# Patient Record
(demographics unavailable — no encounter records)

---

## 2025-03-21 NOTE — ASSESSMENT
[FreeTextEntry1] : Assessment: 59M h/o recurrent nephrolithiasis with signs/sx c/w left sided ureteral stone.   We discussed the natural hx of kidney stones and etiology of pain associated with them. Also discussed importance/value of medical evaluation with 24h urine testing given stone recurrence.   Plan: -CT stone hunt -Follow up after CT

## 2025-03-21 NOTE — HISTORY OF PRESENT ILLNESS
[FreeTextEntry1] : Name TRISH SORIANO MRN [id number]  Jul 11 1965 ------------------------------------------------------------------------------------------------------------------------------------------- Date of First Visit: XXXXXXX Referring Provider/PCP: XXXXXXX/ XXXXXXX ------------------------------------------------------------------------------------------------------------------------------------------- CC: kidney stones   History of Present Illness: TRISH SORIANO is a 59 year old M PMH HLD (diet controlled) and nephrolithiasis who presents for evaluation of kidney stones. He had left flank starting a few days ago, feels like a kidney stone to him. Currently with 9/10 pain, though appears comfortable. Has had some GH. No LUTS. No n/v. Has had ~5 stones in the past - all passed. No prior kidney stone procedure. No recent imaging.    Kidney Stone History: First-time stone former - no Concurrent asymptomatic stone(s) - unknown Previous stone surgeries - no Previous passed stones - yes  Comorbidities - non-contributory Family history of kidney stones - no Previous metabolic evaluation - no

## 2025-04-03 NOTE — HISTORY OF PRESENT ILLNESS
[FreeTextEntry1] : Name TRISH SORIANO MRN [id number]  Jul 1965 ------------------------------------------------------------------------------------------------------------------------------------------- Date of First Visit: 3/21/25 Referring Provider/PCP: XXXXXXX/ XXXXXXX ------------------------------------------------------------------------------------------------------------------------------------------- CC: kidney stones  History of Present Illness: TRISH SORIANO is a 59-year-old M PMH HLD (diet controlled) and nephrolithiasis who presents for evaluation of kidney stones. He had left flank starting a few days ago, feels like a kidney stone to him. Currently with 9/10 pain, though appears comfortable. Has had some GH. No LUTS. No n/v. Has had ~5 stones in the past - all passed. No prior kidney stone procedure. No recent imaging.  Kidney Stone History: First-time stone former - no Concurrent asymptomatic stone(s) - unknown Previous stone surgeries - no Previous passed stones - yes Comorbidities - non-contributory Family history of kidney stones - no Previous metabolic evaluation - no ------------------------------------------------------------------------------------------------------------------------------------------- Interval History (2025): CT shows a 6 mm left mid ureteral stone with mild hydro. Bothered by pain, nausea, decreased appetite.

## 2025-04-03 NOTE — ASSESSMENT
[FreeTextEntry1] : Assessment: 59M h/o recurrent nephrolithiasis with signs/sx c/w 6 mm mid left ureteral stone and 2-4 non-obstructing stones in the ipsilateral kidney.   Watchful Waiting +/- Medical Expulsive Therapy (MET): We can continue to watch the stone and will generally give up to 4 weeks for stone passage. The likelihood of passage goes down with increased stone size and more proximal location. However, I explained that there is always a risk that the stone could become more symptomatic (pain, infection) as it passes or not pass at all, which would require surgical treatment. MET is a conservative option wherein medications (most commonly, an alpha-blocker) and analgesia are prescribed to help expedite the passage of the ureteral stone. In general, it is reserved for distal stones between 6-10 mm, though contemporary scientific evidence is conflicting. The strength of the evidence for MET in small (<6 mm) stones in the distal ureter is weak, though it might still have a clinical benefit in larger ureteral stones (>5 mm). At the same time, risks associated with short-course alpha-blocker therapy are very low and likely outweighed by the potential upside of hastening stone passage.    Shock Wave Lithotripsy (SWL): This is the least invasive form of surgery for stones and an excellent option for select stones. I explained how the procedure is performed and that procedural success is dependent on several stone and environmental factors such as the stone composition or density on CT, stone size, stone location, and body habitus, among others. For these reasons, not all stones/patients are good candidates for SWL. The chances of being stone free after SWL are often much lower compared to other modalities, such as ureteroscopy, except in select cases where stone-free rates are comparable. Therefore, there is a risk that the patient would need subsequent procedures to render them stone-free. Since this is a non-invasive procedure, we are relying on the kidney to spontaneously pass the resultant stone fragments. At the same time, this procedure does carry some perioperative risks, mainly bleeding and infection, as well as the small risk of developing obstruction due to passage of stone fragments, which could require urgent placement of a double-J ureteral stent or nephrostomy tube.   Ureteroscopy: I explained the technique in detail and how it is performed. Complete stone free rates (no residual fragments of any size) approach 90% for ureteral stones and likely range from 50-60% for renal stones. Based on anatomy and stone burden, we can consider addition of steerable vacuum aspiration (CVAC) to help reduce residual stone burden. Very commonly, a ureteral stent is left in place at the conclusion of the procedure, but only if needed. I explained that if a stent is placed, it would need to be removed either cystoscopically under local anesthesia or it may have a string left externally through the urethra for removal in a few days after the procedure. Risks of ureteroscopy include, but are not limited to, bleeding, infection, injury to the bladder or ureter, ureteral perforation, ureteral stricture, residual fragments leading to subsequent symptoms or secondary procedures, and other risks involved with general anesthesia. There is also the risk that the procedure needs to be staged into more than one session based on the patient's internal anatomy and the size of the stone(s). Finally, dilation of the ureter and/or ureteral stent placement prior to definitive ureteroscopy may be necessary to achieve ureteral access safely in up to 5% of patients, particularly those who have not been previously instrumented.   I have answered all the patient's questions and they have elected to undergo a ureteroscopy.     Plan: -Schedule for left ureteroscopy with laser lithotripsy -Pre-operative labs, UA/UCx -Medical clearance requested

## 2025-04-04 NOTE — ASSESSMENT
[FreeTextEntry1] : Assessment: 59M h/o recurrent nephrolithiasis with signs/sx c/w 6 mm mid left ureteral stone and 2-4 non-obstructing stones in the ipsilateral kidney, recent labs indicate ANUJA. Now s/p office-based left ureteral stent insertion and RPG.   Plan: -Proceed with left ureteroscopy with laser lithotripsy as scheduled -Medical clearance pending

## 2025-04-04 NOTE — HISTORY OF PRESENT ILLNESS
[FreeTextEntry1] : Name TRISH SORIANO MRN [id number]  Jul 1965 ------------------------------------------------------------------------------------------------------------------------------------------- Date of First Visit: 3/21/25 Referring Provider/PCP: XXXXXXX/ XXXXXXX ------------------------------------------------------------------------------------------------------------------------------------------- CC: kidney stones  History of Present Illness: TRISH SORIANO is a 59-year-old M PMH HLD (diet controlled) and nephrolithiasis who presents for evaluation of kidney stones. He had left flank starting a few days ago, feels like a kidney stone to him. Currently with 9/10 pain, though appears comfortable. Has had some GH. No LUTS. No n/v. Has had ~5 stones in the past - all passed. No prior kidney stone procedure. No recent imaging.  Kidney Stone History: First-time stone former - no Concurrent asymptomatic stone(s) - unknown Previous stone surgeries - no Previous passed stones - yes Comorbidities - non-contributory Family history of kidney stones - no Previous metabolic evaluation - no ------------------------------------------------------------------------------------------------------------------------------------------- Interval History (2025): CT shows a 6 mm left mid ureteral stone with mild hydro. Bothered by pain, nausea, decreased appetite. ------------------------------------------------------------------------------------------------------------------------------------------- Interval History (2025): Follows up for ANUJA - Cr 1.85 ("normal" at baseline per wife). Discussed options including ED visit vs office-based stent insertion. He elected office stent insertion (see procedure note for details).

## 2025-04-25 NOTE — ASSESSMENT
[FreeTextEntry1] : Assessment:  TRISH SORIANO is a 59 year M who presents s/p LEFT ureteroscopy with laser lithotripsy and stent placement on 4/22 . Patient is recurrent stone former with risk factors for recurrence.     -We reviewed common symptoms after stent removal and advised that they should resolve within the next couple of days. Patient knows to contact the office if they experience any fevers (temp >100.4) or any other concerns.    -We discussed generalized stone prevention strategies, metabolic evaluation (serum chemistry profile and 24-hour urine collection +/- PTH testing if serum Ca is elevated), and the natural history of kidney stone disease. I explained that first-time stone formers generally do not need a complete metabolic work-up in the absence of risk factors. However, without behavioral and dietary modification, they are at a heightened risk of developing future symptomatic stones: 10% at 2 years, 20% at 5 years, and 30% at 10 years ([facility] data). In recurrent stone formers, the risk of recurrence is considerably higher with a lifetime recurrence rate of 60-80%. Risk factors include stone type, total stone volume, family history, multiple stones, and many medical comorbidities (DM, HTN, HLD, obesity, gout, HPT).   Generalized stone prevention counseling was provided as follows:   1. High fluid intake. The goal should be to drink enough to produce 2.5 liters (quarts) of urine daily. Most studies have shown that high fluid volume intake will decrease the risk of stone formation. Research generally indicates that there appears to be little difference between hard and soft water in the formation of kidney stones. Lemon juice added to the water may also aid with increasing urine pH, urine citric acid and reducing stone formation.    2. Dietary recommendations. The key to all dietary recommendations is moderation.  Decrease animal protein consumption. High protein intake increases urinary calcium, oxalate, and uric acid excretion into the urine - all of which will increase the probability of stone formation.  Decrease sodium intake by avoiding heavily salted foods, and resist adding salt to food at the table. Sodium restriction is widely recognized as an important element of dietary prevention of recurrent kidney stones.   Dietary calcium.  Patient should consume a daily recommended allowance of dietary calcium (800-1200 mg). Patients who take in too much Ca or too little Ca are at an increased risk of recurrent stone formation. Dietary calcium is preferable to supplements. Calcium supplementation can be safe when the calcium is taken with meals, rather than at bedtime. Another suggestion for patients who have been recommended to take calcium supplements for their bone health is to consider using calcium citrate, an over-the-counter preparation that provides 950 mg of calcium citrate and 200 mg of elemental calcium in each tablet.  Avoid excess intake of foods with high oxalate content, including dark leafy greens, beets, nuts, tea, coffee, and chocolate. Strict avoidance of oxalate is not necessary in most cases.  Avoid high doses of vitamin C. Intake should be limited to a maximum daily dose of less than 2 gm (2,000 mg).      Plan: -Follow up in 1 month with renal US in office -[facility]

## 2025-04-25 NOTE — ASSESSMENT
[FreeTextEntry1] : Assessment:  TRISH SORIANO is a 59 year M who presents s/p LEFT ureteroscopy with laser lithotripsy and stent placement on 4/22 . Patient is recurrent stone former with risk factors for recurrence.     -We reviewed common symptoms after stent removal and advised that they should resolve within the next couple of days. Patient knows to contact the office if they experience any fevers (temp >100.4) or any other concerns.    -We discussed generalized stone prevention strategies, metabolic evaluation (serum chemistry profile and 24-hour urine collection +/- PTH testing if serum Ca is elevated), and the natural history of kidney stone disease. I explained that first-time stone formers generally do not need a complete metabolic work-up in the absence of risk factors. However, without behavioral and dietary modification, they are at a heightened risk of developing future symptomatic stones: 10% at 2 years, 20% at 5 years, and 30% at 10 years ([facility] data). In recurrent stone formers, the risk of recurrence is considerably higher with a lifetime recurrence rate of 60-80%. Risk factors include stone type, total stone volume, family history, multiple stones, and many medical comorbidities (DM, HTN, HLD, obesity, gout, HPT).   Generalized stone prevention counseling was provided as follows:   1. High fluid intake. The goal should be to drink enough to produce 2.5 liters (quarts) of urine daily. Most studies have shown that high fluid volume intake will decrease the risk of stone formation. Research generally indicates that there appears to be little difference between hard and soft water in the formation of kidney stones. Lemon juice added to the water may also aid with increasing urine pH, urine citric acid and reducing stone formation.    2. Dietary recommendations. The key to all dietary recommendations is moderation.  Decrease animal protein consumption. High protein intake increases urinary calcium, oxalate, and uric acid excretion into the urine - all of which will increase the probability of stone formation.  Decrease sodium intake by avoiding heavily salted foods, and resist adding salt to food at the table. Sodium restriction is widely recognized as an important element of dietary prevention of recurrent kidney stones.   Dietary calcium.  Patient should consume a daily recommended allowance of dietary calcium (800-1200 mg). Patients who take in too much Ca or too little Ca are at an increased risk of recurrent stone formation. Dietary calcium is preferable to supplements. Calcium supplementation can be safe when the calcium is taken with meals, rather than at bedtime. Another suggestion for patients who have been recommended to take calcium supplements for their bone health is to consider using calcium citrate, an over-the-counter preparation that provides 950 mg of calcium citrate and 200 mg of elemental calcium in each tablet.  Avoid excess intake of foods with high oxalate content, including dark leafy greens, beets, nuts, tea, coffee, and chocolate. Strict avoidance of oxalate is not necessary in most cases.  Avoid high doses of vitamin C. Intake should be limited to a maximum daily dose of less than 2 gm (2,000 mg).      Plan: -Follow up in 1 month with renal US in office -[facility]

## 2025-04-25 NOTE — ASSESSMENT
[FreeTextEntry1] : Assessment:  TRISH SORIANO is a 59 year M who presents s/p LEFT ureteroscopy with laser lithotripsy and stent placement on 4/22 . Patient is recurrent stone former with risk factors for recurrence.     -We reviewed common symptoms after stent removal and advised that they should resolve within the next couple of days. Patient knows to contact the office if they experience any fevers (temp >100.4) or any other concerns.    -We discussed generalized stone prevention strategies, metabolic evaluation (serum chemistry profile and 24-hour urine collection +/- PTH testing if serum Ca is elevated), and the natural history of kidney stone disease. I explained that first-time stone formers generally do not need a complete metabolic work-up in the absence of risk factors. However, without behavioral and dietary modification, they are at a heightened risk of developing future symptomatic stones: 10% at 2 years, 20% at 5 years, and 30% at 10 years ([facility] data). In recurrent stone formers, the risk of recurrence is considerably higher with a lifetime recurrence rate of 60-80%. Risk factors include stone type, total stone volume, family history, multiple stones, and many medical comorbidities (DM, HTN, HLD, obesity, gout, HPT).   Generalized stone prevention counseling was provided as follows:   1. High fluid intake. The goal should be to drink enough to produce 2.5 liters (quarts) of urine daily. Most studies have shown that high fluid volume intake will decrease the risk of stone formation. Research generally indicates that there appears to be little difference between hard and soft water in the formation of kidney stones. Lemon juice added to the water may also aid with increasing urine pH, urine citric acid and reducing stone formation.    2. Dietary recommendations. The key to all dietary recommendations is moderation.  Decrease animal protein consumption. High protein intake increases urinary calcium, oxalate, and uric acid excretion into the urine - all of which will increase the probability of stone formation.  Decrease sodium intake by avoiding heavily salted foods, and resist adding salt to food at the table. Sodium restriction is widely recognized as an important element of dietary prevention of recurrent kidney stones.   Dietary calcium.  Patient should consume a daily recommended allowance of dietary calcium (800-1200 mg). Patients who take in too much Ca or too little Ca are at an increased risk of recurrent stone formation. Dietary calcium is preferable to supplements. Calcium supplementation can be safe when the calcium is taken with meals, rather than at bedtime. Another suggestion for patients who have been recommended to take calcium supplements for their bone health is to consider using calcium citrate, an over-the-counter preparation that provides 950 mg of calcium citrate and 200 mg of elemental calcium in each tablet.  Avoid excess intake of foods with high oxalate content, including dark leafy greens, beets, nuts, tea, coffee, and chocolate. Strict avoidance of oxalate is not necessary in most cases.  Avoid high doses of vitamin C. Intake should be limited to a maximum daily dose of less than 2 gm (2,000 mg).      Plan: -Follow up in 1 month with renal US in office -[facility]

## 2025-04-25 NOTE — ASSESSMENT
[FreeTextEntry1] : Assessment:  TRISH SORIANO is a 59 year M who presents s/p LEFT ureteroscopy with laser lithotripsy and stent placement on 4/22 . Patient is recurrent stone former with risk factors for recurrence.     -We reviewed common symptoms after stent removal and advised that they should resolve within the next couple of days. Patient knows to contact the office if they experience any fevers (temp >100.4) or any other concerns.    -We discussed generalized stone prevention strategies, metabolic evaluation (serum chemistry profile and 24-hour urine collection +/- PTH testing if serum Ca is elevated), and the natural history of kidney stone disease. I explained that first-time stone formers generally do not need a complete metabolic work-up in the absence of risk factors. However, without behavioral and dietary modification, they are at a heightened risk of developing future symptomatic stones: 10% at 2 years, 20% at 5 years, and 30% at 10 years ([facility] data). In recurrent stone formers, the risk of recurrence is considerably higher with a lifetime recurrence rate of 60-80%. Risk factors include stone type, total stone volume, family history, multiple stones, and many medical comorbidities (DM, HTN, HLD, obesity, gout, HPT).   Generalized stone prevention counseling was provided as follows:   1. High fluid intake. The goal should be to drink enough to produce 2.5 liters (quarts) of urine daily. Most studies have shown that high fluid volume intake will decrease the risk of stone formation. Research generally indicates that there appears to be little difference between hard and soft water in the formation of kidney stones. Lemon juice added to the water may also aid with increasing urine pH, urine citric acid and reducing stone formation.    2. Dietary recommendations. The key to all dietary recommendations is moderation.  Decrease animal protein consumption. High protein intake increases urinary calcium, oxalate, and uric acid excretion into the urine - all of which will increase the probability of stone formation.  Decrease sodium intake by avoiding heavily salted foods, and resist adding salt to food at the table. Sodium restriction is widely recognized as an important element of dietary prevention of recurrent kidney stones.   Dietary calcium.  Patient should consume a daily recommended allowance of dietary calcium (800-1200 mg). Patients who take in too much Ca or too little Ca are at an increased risk of recurrent stone formation. Dietary calcium is preferable to supplements. Calcium supplementation can be safe when the calcium is taken with meals, rather than at bedtime. Another suggestion for patients who have been recommended to take calcium supplements for their bone health is to consider using calcium citrate, an over-the-counter preparation that provides 950 mg of calcium citrate and 200 mg of elemental calcium in each tablet.  Avoid excess intake of foods with high oxalate content, including dark leafy greens, beets, nuts, tea, coffee, and chocolate. Strict avoidance of oxalate is not necessary in most cases.  Avoid high doses of vitamin C. Intake should be limited to a maximum daily dose of less than 2 gm (2,000 mg).      Plan: -Follow up in 1 month with renal US in office -[facility]

## 2025-04-25 NOTE — ADDENDUM
[FreeTextEntry1] : I, Dr. Rojas Ureña, personally performed the evaluation and management (E/M) services for this established patient with new problem(s)/exacerbation of existing condition(s). That E/M includes conducting the clinically appropriate interval history &/or physical exam, assessing all new/exacerbated conditions, and establishing a new care plan. My NP, Lizette Centeno, was here to observe my E/M services for this patient.

## 2025-07-03 NOTE — ASSESSMENT
[FreeTextEntry1] : #Calcium metabolism disorder #Calcium oxalate calculus -> Add 15Meq of K-citrate BID -> Increase water intake -> Repeat litholink in 6 months. Patient to f/u at [facility].

## 2025-07-03 NOTE — HISTORY OF PRESENT ILLNESS
[FreeTextEntry1] : Name TRISH SORIANO MRN [id number]  Jul 1965 ------------------------------------------------------------------------------------------------------------------------------------------- Date of First Visit: 3/21/25 Referring Provider/PCP: Dr. Rick Parks ------------------------------------------------------------------------------------------------------------------------------------------- CC: kidney stones  History of Present Illness: TRISH SORIANO is a 59-year-old M PMH HLD (diet controlled) and nephrolithiasis who presents for evaluation of kidney stones. He had left flank starting a few days ago, feels like a kidney stone to him. Currently with 9/10 pain, though appears comfortable. Has had some GH. No LUTS. No n/v. Has had ~5 stones in the past - all passed. No prior kidney stone procedure. No recent imaging.  Kidney Stone History: First-time stone former - no Concurrent asymptomatic stone(s) - unknown Previous stone surgeries - no Previous passed stones - yes Comorbidities - non-contributory Family history of kidney stones - no Previous metabolic evaluation - no ------------------------------------------------------------------------------------------------------------------------------------------- Interval History (2025): CT shows a 6 mm left mid ureteral stone with mild hydro. Bothered by pain, nausea, decreased appetite. ------------------------------------------------------------------------------------------------------------------------------------------- Interval History (2025): Follows up for ANUJA - Cr 1.85 ("normal" at baseline per wife). Discussed options including ED visit vs office-based stent insertion. He elected office stent insertion (see procedure note for details). ------------------------------------------------------------------------------------------------------------------------------------------- Interval History (2025): TRISH SORIANO presents s/p left ureteroscopy with laser lithotripsy and stent placement on  . He had a 6 mm stone in the mid left ureter. He tolerated the procedure well and was discharged home on the same day.  Procedure: Stent was left on a string and removed in the office today without difficulty. Stent was intact. Patient tolerated the procedure well. ------------------------------------------------------------------------------------------------------------------------------------------- Interval History (2025): Patient presents for 1-month follow up and renal ultrasound after left URS/LL for ureteral stone and ipsilateral renal stones. Large upper stone behind stenotic infundibulum was treated, as was small stones in the lower pole. Ultrasound performed in the office today demonstrates no evidence of hydronephrosis bilaterally. Multiple echogenic foci c/f residual fragments. Stone composition: 100% COM Results of 24-hour urine analysis notable for suboptimal urine output and hypocitraturia.

## 2025-07-03 NOTE — ASSESSMENT
[FreeTextEntry1] : Assessment: TRISH SORIANO is a 59 year old M with nephrolithiasis with recent stone obstruction c/b ANUJA s/p left URS/LL. E/o stones/residual fragments in kidney on office US. Low urine vol and hypocitraturia on Litholink.  Plan: -K cit 15 BID -BMP 2 weeks -Repeat 24hu testing prior to next visit -Follow up visit in 6 months with renal ultrasound for MDC -Continue with generalized stone prevention strategies as previously discussed (increase fluid intake to keep urine clear or very faint yellow, limit dietary salt intake, increase fruits and vegetables, limit high oxalate foods, maintain normal dietary calcium, and moderation of non-dairy animal protein)

## 2025-07-03 NOTE — REVIEW OF SYSTEMS
[Fever] : no fever [Chills] : no chills [Heart Rate Is Slow] : the heart rate was not slow [Heart Rate Is Fast] : the heart rate was not fast [Chest Pain] : no chest pain [Palpitations] : no palpitations [Shortness Of Breath] : no shortness of breath [Wheezing] : no wheezing [Cough] : no cough [SOB on Exertion] : no shortness of breath during exertion [Abdominal Pain] : no abdominal pain [Vomiting] : no vomiting [Confused] : no confusion [Convulsions] : no convulsions

## 2025-07-03 NOTE — HISTORY OF PRESENT ILLNESS
[FreeTextEntry1] : Reason for visit: Part of multidisciplinary stone clinic  HPI 60 y/o M with recurrent stones here today to establish care. Seen as part of [facility] [place]. Most recent episode in April 2025 requiring left ureteroscopy with laser lithotripsy and stent placement on 4/22. Stent was subsequently removed. Patient here today for follow up and to go over 24 hour urine chemistry done via litholink. [facility] is [facility] his [facility]. He had ANUJA back in April that he reportedly says has resolved. His renal US in office today does not show any renal stones. The composition of the stone was 100% calcium oxalate. We discussed general stone guidelines extensively. We then reviewed his Litholink. Advised to increase water intake and aim for urine output to 2L. We also discussed to increase intake of fruits and vegetables in attempt to increase urinary pH. We also discussed to add K citrate 15Meq BID. Patient verbalized understanding. All questions answered.

## 2025-07-03 NOTE — HISTORY OF PRESENT ILLNESS
[FreeTextEntry1] : Name TRISH SORIANO MRN [id number]  Jul 1965 ------------------------------------------------------------------------------------------------------------------------------------------- Date of First Visit: 3/21/25 Referring Provider/PCP: Dr. Rick Parks ------------------------------------------------------------------------------------------------------------------------------------------- CC: kidney stones  History of Present Illness: TRISH SORIANO is a 59-year-old M PMH HLD (diet controlled) and nephrolithiasis who presents for evaluation of kidney stones. He had left flank starting a few days ago, feels like a kidney stone to him. Currently with 9/10 pain, though appears comfortable. Has had some GH. No LUTS. No n/v. Has had ~5 stones in the past - all passed. No prior kidney stone procedure. No recent imaging.  Kidney Stone History: First-time stone former - no Concurrent asymptomatic stone(s) - unknown Previous stone surgeries - no Previous passed stones - yes Comorbidities - non-contributory Family history of kidney stones - no Previous metabolic evaluation - no ------------------------------------------------------------------------------------------------------------------------------------------- Interval History (2025): CT shows a 6 mm left mid ureteral stone with mild hydro. Bothered by pain, nausea, decreased appetite. ------------------------------------------------------------------------------------------------------------------------------------------- Interval History (2025): Follows up for ANUJA - Cr 1.85 ("normal" at baseline per wife). Discussed options including ED visit vs office-based stent insertion. He elected office stent insertion (see procedure note for details). ------------------------------------------------------------------------------------------------------------------------------------------- Interval History (2025): TRISH SORIANO presents s/p left ureteroscopy with laser lithotripsy and stent placement on  . He had a 6 mm stone in the mid left ureter. He tolerated the procedure well and was discharged home on the same day.  Procedure: Stent was left on a string and removed in the office today without difficulty. Stent was intact. Patient tolerated the procedure well. ------------------------------------------------------------------------------------------------------------------------------------------- Interval History (2025): Patient presents for 1-month follow up and renal ultrasound after left URS/LL for ureteral stone and ipsilateral renal stones. Large upper stone behind stenotic infundibulum was treated, as was small stones in the lower pole. Ultrasound performed in the office today demonstrates no evidence of hydronephrosis bilaterally. Multiple echogenic foci c/f residual fragments. Stone composition: 100% COM Results of 24-hour urine analysis notable for suboptimal urine output and hypocitraturia.